# Patient Record
Sex: MALE | Race: WHITE | Employment: UNEMPLOYED | URBAN - METROPOLITAN AREA
[De-identification: names, ages, dates, MRNs, and addresses within clinical notes are randomized per-mention and may not be internally consistent; named-entity substitution may affect disease eponyms.]

---

## 2017-02-08 ENCOUNTER — OFFICE VISIT (OUTPATIENT)
Dept: ORTHOPEDIC SURGERY | Age: 57
End: 2017-02-08

## 2017-02-08 VITALS
WEIGHT: 176 LBS | BODY MASS INDEX: 28.41 KG/M2 | DIASTOLIC BLOOD PRESSURE: 83 MMHG | HEART RATE: 70 BPM | TEMPERATURE: 98 F | SYSTOLIC BLOOD PRESSURE: 163 MMHG

## 2017-02-08 DIAGNOSIS — M19.172 POST-TRAUMATIC OSTEOARTHRITIS OF LEFT ANKLE: Primary | ICD-10-CM

## 2017-02-08 DIAGNOSIS — M79.672 LEFT FOOT PAIN: ICD-10-CM

## 2017-02-08 RX ORDER — HYDROCODONE BITARTRATE AND ACETAMINOPHEN 7.5; 325 MG/1; MG/1
1 TABLET ORAL
Qty: 65 TAB | Refills: 0 | Status: SHIPPED | OUTPATIENT
Start: 2017-03-09 | End: 2017-04-09

## 2017-02-08 RX ORDER — HYDROCODONE BITARTRATE AND ACETAMINOPHEN 7.5; 325 MG/1; MG/1
1 TABLET ORAL
Qty: 65 TAB | Refills: 0 | Status: SHIPPED | OUTPATIENT
Start: 2017-04-10 | End: 2017-05-10

## 2017-02-08 RX ORDER — HYDROCODONE BITARTRATE AND ACETAMINOPHEN 7.5; 325 MG/1; MG/1
1 TABLET ORAL
Qty: 65 TAB | Refills: 0 | Status: SHIPPED | OUTPATIENT
Start: 2017-02-08 | End: 2017-03-08

## 2017-02-08 NOTE — PATIENT INSTRUCTIONS
Please follow up in 3 months. You are advised to contact us if your condition worsens. Osteoarthritis: Care Instructions  Your Care Instructions    Arthritis is a common health problem in which the joints are inflamed. There are several kinds of arthritis. Osteoarthritis is caused by a breakdown of cartilage, the hard, thick tissue that cushions the joints. It causes pain, stiffness, and swelling, often in the spine, fingers, hips, and knees. Osteoarthritis can happen at any age, but it is most common in older people. Osteoarthritis never goes away completely, but it can be controlled. Medicine and home treatment can reduce the pain and prevent the arthritis from getting worse. Follow-up care is a key part of your treatment and safety. Be sure to make and go to all appointments, and call your doctor if you are having problems. Its also a good idea to know your test results and keep a list of the medicines you take. How can you care for yourself at home? · Take a warm shower or bath in the morning to relieve stiffness. Avoid sitting still afterwards. · If the joint is not swollen, use moist heat, like a warm, damp towel, for 20 to 30 minutes, 2 or 3 times a day. Do not use heat on a swollen joint. · If the joint is swollen, use ice or cold packs for 10 to 20 minutes, once an hour. Cold will help relieve pain and reduce inflammation. Put a thin cloth between the ice and your skin. · To prevent stiffness, gently move the joint through its full range of motion several times a day. · If the joint hurts, avoid activities that put a strain on it for a few days. Take rest breaks throughout the day. · Get regular exercise. Walking, swimming, yoga, biking, and water aerobics are good exercises that are gentle on the joints. · Reach and stay at a healthy weight. If you need to lose or maintain weight, regular exercise and a healthy diet will help.  Extra weight can strain the joints, especially the knees and hips, and make the pain worse. Losing even a few pounds may help. · Take pain medicines exactly as directed. ¨ If the doctor gave you a prescription medicine for pain, take it as prescribed. ¨ If you are not taking a prescription pain medicine, ask your doctor if you can take an over-the-counter medicine. When should you call for help? Call your doctor now or seek immediate medical care if:  · The pain is so bad that you cannot use the joint. · You have sudden back pain with weakness in your legs or loss of bowel or bladder control. · Your stools are black and tarlike or have streaks of blood. · You have severe pain and swelling in more than one joint. Watch closely for changes in your health, and be sure to contact your doctor if:  · You have side effects from the medicines, like belly pain, ongoing heartburn, or nausea. · Joint pain continues for more than 6 weeks, and home treatment is not helping. Where can you learn more? Go to http://viola-kenneth.info/. Enter U246 in the search box to learn more about \"Osteoarthritis: Care Instructions. \"  Current as of: February 24, 2016  Content Version: 11.1  © 0844-1747 [employer]. Care instructions adapted under license by [employer] (which disclaims liability or warranty for this information). If you have questions about a medical condition or this instruction, always ask your healthcare professional. [place] [address] any warranty or liability for your use of this information.

## 2017-02-08 NOTE — MR AVS SNAPSHOT
Visit Information Date & Time Provider Department Dept. Phone Encounter #  
 2/8/2017  9:50 AM Lauren Holly, [address] [facility] [phone] [id number] Your Appointments 6/23/2017  8:30 AM  
Follow Up with Maddie Rivera MD  
[facility] - SPECIALTY [facility] ([address]) Appt Note: 6 mo f/u lower back and hips 2012 [facility] [place] [id number] Upcoming Health Maintenance Date Due Hepatitis C Screening 1960 Pneumococcal 19-64 Medium Risk (1 of 1 - PPSV23) 10/20/1979 DTaP/Tdap/Td series (1 - Tdap) 10/20/1981 FOBT Q 1 YEAR AGE 50-75 10/20/2010 INFLUENZA AGE 9 TO ADULT 8/1/2016 Allergies as of 2/8/2017  Review Complete On: 2/8/2017 By: Renzo Clemente Severity Noted Reaction Type Reactions Pcn [Penicillins]  08/01/2013    Rash, Other (comments) As a child - does not know the RXN Sting, Bee    Unknown (comments) Current Immunizations  Never Reviewed No immunizations on file. Not reviewed this visit You Were Diagnosed With   
  
 Codes Comments Post-traumatic osteoarthritis of left ankle    -  Primary ICD-10-CM: T72.935 ICD-9-CM: 715.27 Left foot pain     ICD-10-CM: F75.720 ICD-9-CM: 729.5 Vitals BP Pulse Temp Weight(growth percentile) BMI Smoking Status 163/83 (BP 1 Location: Left arm, BP Patient Position: Sitting) 70 98 °F (36.7 °C) (Oral) 176 lb (79.8 kg) 28.41 kg/m2 Current Every Day Smoker Vitals History BMI and BSA Data Body Mass Index Body Surface Area  
 28.41 kg/m 2 1.93 m 2 Preferred Pharmacy Pharmacy Name Phone [facility], [address] [place] [address] [phone] Your Updated Medication List  
  
   
This list is accurate as of: 2/8/17 10:57 AM.  Always use your most recent med list.  
  
  
  
  
 ASPIRIN LOW DOSE PO Take  by mouth. COREG 6.25 mg tablet Generic drug:  carvedilol Take  by mouth two (2) times daily (with meals). FLONASE 50 mcg/actuation nasal spray Generic drug:  fluticasone 2 Sprays by Both Nostrils route nightly. * gabapentin 800 mg tablet Commonly known as:  NEURONTIN Take 1 Tab by mouth three (3) times daily. * gabapentin 800 mg tablet Commonly known as:  NEURONTIN Take 1 Tab by mouth three (3) times daily. HYDROcodone-acetaminophen 7.5-325 mg per tablet Commonly known as:  Nan Mura Take 2 Tabs by mouth two (2) times daily as needed for Pain for up to 31 days. Max Daily Amount: 4 Tabs. levocetirizine 5 mg tablet Commonly known as:  Maegan Downs Take 5 mg by mouth daily. PROTONIX 40 mg tablet Generic drug:  pantoprazole Take 40 mg by mouth daily. * simvastatin 40 mg tablet Commonly known as:  ZOCOR Take 20 mg by mouth nightly. * simvastatin 20 mg tablet Commonly known as:  ZOCOR TK 1 T PO QHS * Notice: This list has 4 medication(s) that are the same as other medications prescribed for you. Read the directions carefully, and ask your doctor or other care provider to review them with you. Patient Instructions Please follow up in 3 months. You are advised to contact us if your condition worsens. Osteoarthritis: Care Instructions Your Care Instructions Arthritis is a common health problem in which the joints are inflamed. There are several kinds of arthritis. Osteoarthritis is caused by a breakdown of cartilage, the hard, thick tissue that cushions the joints. It causes pain, stiffness, and swelling, often in the spine, fingers, hips, and knees. Osteoarthritis can happen at any age, but it is most common in older people. Osteoarthritis never goes away completely, but it can be controlled.  Medicine and home treatment can reduce the pain and prevent the arthritis from getting worse. Follow-up care is a key part of your treatment and safety. Be sure to make and go to all appointments, and call your doctor if you are having problems. Its also a good idea to know your test results and keep a list of the medicines you take. How can you care for yourself at home? · Take a warm shower or bath in the morning to relieve stiffness. Avoid sitting still afterwards. · If the joint is not swollen, use moist heat, like a warm, damp towel, for 20 to 30 minutes, 2 or 3 times a day. Do not use heat on a swollen joint. · If the joint is swollen, use ice or cold packs for 10 to 20 minutes, once an hour. Cold will help relieve pain and reduce inflammation. Put a thin cloth between the ice and your skin. · To prevent stiffness, gently move the joint through its full range of motion several times a day. · If the joint hurts, avoid activities that put a strain on it for a few days. Take rest breaks throughout the day. · Get regular exercise. Walking, swimming, yoga, biking, and water aerobics are good exercises that are gentle on the joints. · Reach and stay at a healthy weight. If you need to lose or maintain weight, regular exercise and a healthy diet will help. Extra weight can strain the joints, especially the knees and hips, and make the pain worse. Losing even a few pounds may help. · Take pain medicines exactly as directed. ¨ If the doctor gave you a prescription medicine for pain, take it as prescribed. ¨ If you are not taking a prescription pain medicine, ask your doctor if you can take an over-the-counter medicine. When should you call for help? Call your doctor now or seek immediate medical care if: · The pain is so bad that you cannot use the joint. · You have sudden back pain with weakness in your legs or loss of bowel or bladder control. · Your stools are black and tarlike or have streaks of blood. · You have severe pain and swelling in more than one joint. Watch closely for changes in your health, and be sure to contact your doctor if: 
· You have side effects from the medicines, like belly pain, ongoing heartburn, or nausea. · Joint pain continues for more than 6 weeks, and home treatment is not helping. Where can you learn more? Go to http://viola-kenneth.info/. Enter E419 in the search box to learn more about \"Osteoarthritis: Care Instructions. \" Current as of: February 24, 2016 Content Version: 11.1 © 3304-8792 [employer]. Care instructions adapted under license by [employer] (which disclaims liability or warranty for this information). If you have questions about a medical condition or this instruction, always ask your healthcare professional. [address] any warranty or liability for your use of this information. Introducing \[facility]\"" & HEALTH SERVICES! Lance Jones introduces Healthcare MarketMaker patient portal. Now you can access parts of your medical record, email your doctor's office, and request medication refills online. 1. In your internet browser, go to [email or web address] 2. Click on the First Time User? Click Here link in the Sign In box. You will see the New Member Sign Up page. 3. Enter your Healthcare [employer] Access Code exactly as it appears below. You will not need to use this code after youve completed the sign-up process. If you do not sign up before the expiration date, you must request a new code. · Healthcare MarketM[employer] Access Code: [email or web address] Expires: 5/9/2017 10:57 AM 
 
4. Enter the last four digits of your Social Security Number (xxxx) and Date of Birth (mm/dd/yyyy) as indicated and click Submit. You will be taken to the next sign-up page. 5. Create a Mobclixt ID. This will be your Healthcare [employer]Maker login ID and cannot be changed, so think of one that is secure and easy to remember. 6. Create a [employer]xt password. You can change your password at any time. 7. Enter your Password Reset Question and Answer. This can be used at a later time if you forget your password. 8. Enter your e-mail address. You will receive e-mail notification when new information is available in [address] 9. Click Sign Up. You can now view and download portions of your medical record. 10. Click the Download Summary menu link to download a portable copy of your medical information. If you have questions, please visit the Frequently Asked Questions section of the [employer] website. Remember, [employer] is NOT to be used for urgent needs. For medical emergencies, dial 911. Now available from your iPhone and Android! Please provide this summary of care documentation to your next provider. Your primary care clinician is listed as Shayla Cadena. If you have any questions after today's visit, please call [phone].

## 2017-02-08 NOTE — PROGRESS NOTES
AMBULATORY PROGRESS NOTE      Patient: Rene Fabian. MRN: [id number]     SSN: [id number] Body mass index is 28.41 kg/(m^2). YOB: 1960     AGE: 64 y.o. EX: male    PCP: Vannesa Schmitt MD    IMPRESSION/DIAGNOSIS AND TREATMENT PLAN     DIAGNOSES  1. Post-traumatic osteoarthritis of left ankle    2. Left foot pain        Orders Placed This Encounter    HYDROcodone-acetaminophen (NORCO) 7.5-325 mg per tablet    HYDROcodone-acetaminophen (NORCO) 7.5-325 mg per tablet    HYDROcodone-acetaminophen (NORCO) 7.5-325 mg per tablet      Rene Fabian. understands his diagnoses and the proposed plan. Plan:    1) Norco 7.5 m PO BID (with occasional third tab if pain severe); 65 tablets - three prescriptions    RTO - 3 months    HPI Gildardo Gomez. IS A 64 y.o. male who presents to my outpatient office follow up of left subtalar joint osteoarthritis, status post having a remote calcaneus fracture with h/o osteomyelitis. At last visit, I prescribed three months worth of prescriptions of Norco 7.5 mg. The patient presents to the office today ambulating with a single point cane. His pain continues and is most severe through his anterior ankle. When he steps wrong and flexes his ankle he has \"pain like you cannot believe\". He stated that he has been managing his blood pressure. He denies problems with his gastrointestinal system while taking the Norco 7.5 mg.      He had surgery in [place], [place].  He takes Norco 1-2 tab daily, sometimes 3 tabs if his pain becomes worse.      INTERVAL HISTORY:  He is pretty much on autopilot. History of coronary artery disease.  He cannot take anti-inflammatories as he takes blood thinners as well and has hypertension.  Has been very reliable in following up with me on regular intervals to assess his progress and to treat him for his chronic pain.       ANKLE/FOOT Left     Psychiatry: Alert, Oriented x 3; Speech normal in context and clarity,    Memory intact grossly, no involuntary movements - tremors, no dementia  Gait: shuffling  Tenderness: moderate tenderness across right hindfoot  Cutaneous: WNL, He has a well-healed surgical scar laterally. Joint Motion: WNL, limited ankle DF and PF arch, and no hindfoot motion  Joint / Tendon Stability: No Ankle or Subtalar instability or joint laxity. No peroneal sublux ability or dislocation  Alignment:  Normal Foot Alignment moderate and  Flexible, Semi Rigid and Rigid  Neuro Motor/Sensory: NL/NL  Vascular: NL foot/ankle pulses  Lymphatics: No extremity lymphedema, No calf swelling, no tenderness to calf muscles. CHART REVIEW     Past Medical History   Diagnosis Date    Arthritis     CAD (coronary artery disease)     Calcaneus fracture     Calf pain     CH[facility]estive [facility] failure[facility])     Depression     GERD (gastroesophageal reflux disease)     H/O back injury      lumbar - DJD; narrowing of the spine; ? nerve damage    H/O seasonal allergies      all year round    High cholesterol     Hypercholesteremia     Hypertension     Low back pain     Lumbar spinal stenosis     Skull fracture ([facility])      no surgery - between eye and nose     Current Outpatient Prescriptions   Medication Sig    [START ON 4/10/2017] HYDROcodone-acetaminophen (NORCO) 7.5-325 mg per tablet Take 1 Tab by mouth two (2) times daily as needed for Pain for up to 30 days. Max Daily Amount: 2 Tabs.  [START ON 3/9/2017] HYDROcodone-acetaminophen (NORCO) 7.5-325 mg per tablet Take 1 Tab by mouth two (2) times daily as needed for Pain for up to 31 days. Max Daily Amount: 2 Tabs.  HYDROcodone-acetaminophen (NORCO) 7.5-325 mg per tablet Take 1 Tab by mouth two (2) times daily as needed for Pain for up to 28 days. Max Daily Amount: 2 Tabs.     simvastatin (ZOCOR) 20 mg tablet TK 1 T PO QHS    carvedilol (COREG) 6.25 mg tablet Take  by mouth two (2) times daily (with meals).  ASPIRIN LOW DOSE PO Take  by mouth.  pantoprazole (PROTONIX) 40 mg tablet Take 40 mg by mouth daily.  Levocetirizine (XYZAL) 5 mg tablet Take 5 mg by mouth daily.  fluticasone (FLONASE) 50 mcg/actuation nasal spray 2 Sprays by Both Nostrils route nightly.  simvastatin (ZOCOR) 40 mg tablet Take 20 mg by mouth nightly.  gabapentin (NEURONTIN) 800 mg tablet Take 1 Tab by mouth three (3) times daily.  HYDROcodone-acetaminophen (NORCO) 7.5-325 mg per tablet Take 2 Tabs by mouth two (2) times daily as needed for Pain for up to 31 days. Max Daily Amount: 4 Tabs.  gabapentin (NEURONTIN) 800 mg tablet Take 1 Tab by mouth three (3) times daily. No current facility-administered medications for this visit. Allergies   Allergen Reactions    Pcn [Penicillins] Rash and Other (comments)     As a child - does not know the RXN    Sting, Bee Unknown (comments)     Past Surgical History   Procedure Laterality Date    Hx orthopaedic       left foot surgery - shattered the heel and broke the calcanus    Hx mohs procedure Left 9/13     Social History     Occupational History    Not on file. Social History Main Topics    Smoking status: Current Every Day Smoker     Packs/day: 1.00     Years: 40.00    Smokeless tobacco: Never Used    Alcohol use No    Drug use: No    Sexual activity: Not on file     Family History   Problem Relation Age of Onset    Cancer Other     Hypertension Other     Heart Disease Other     Diabetes Other        REVIEW OF SYSTEMS : 2/8/2017  ALL BELOW ARE Negative except : SEE HPI       Constitutional: Negative for fever, chills and weight loss. Neg Weigh Loss  Cardiovascular: Negative for chest pain, claudication and leg swelling. SOB, PIERSON   Gastrointestinal: Negative for  pain, N/V/D/C, Blood in stool or urine,dysuria, hematuria,        Incontinence, pelvic pain  Musculoskeletal: see HPI. Neurological: Negative for dizziness and weakness. Negative for headaches,Visual Changes, Confusion, Seizures,   Psychiatric/Behavioral: Negative for depression, memory loss and substance abuse. Extremities:  Negative for  hair changes, rash or skin lesion changes. Hematologic: Negative for Bleeding problems, bruising, pallor or swollen lymph nodes.   Peripheral Vascular: No calf pain, vascular vein tenderness to calf pain              No calf throbbing, posterior knee throbbing pain    DIAGNOSTIC IMAGING     No notes on file    Written by Amadou Dye, as dictated by Jamshid Conte MD.

## 2017-05-10 ENCOUNTER — OFFICE VISIT (OUTPATIENT)
Dept: ORTHOPEDIC SURGERY | Age: 57
End: 2017-05-10

## 2017-05-10 VITALS
HEART RATE: 51 BPM | BODY MASS INDEX: 28.28 KG/M2 | DIASTOLIC BLOOD PRESSURE: 82 MMHG | TEMPERATURE: 97.5 F | WEIGHT: 176 LBS | SYSTOLIC BLOOD PRESSURE: 148 MMHG | HEIGHT: 66 IN

## 2017-05-10 DIAGNOSIS — G89.29 CHRONIC PAIN OF LEFT ANKLE: ICD-10-CM

## 2017-05-10 DIAGNOSIS — M25.572 CHRONIC PAIN OF LEFT ANKLE: ICD-10-CM

## 2017-05-10 DIAGNOSIS — M19.172 POST-TRAUMATIC OSTEOARTHRITIS OF LEFT ANKLE: Primary | ICD-10-CM

## 2017-05-10 RX ORDER — HYDROCODONE BITARTRATE AND ACETAMINOPHEN 7.5; 325 MG/1; MG/1
1-2 TABLET ORAL
Qty: 65 TAB | Refills: 0 | Status: SHIPPED | OUTPATIENT
Start: 2017-05-10 | End: 2017-06-11

## 2017-05-10 RX ORDER — HYDROCODONE BITARTRATE AND ACETAMINOPHEN 7.5; 325 MG/1; MG/1
1-2 TABLET ORAL
Qty: 65 TAB | Refills: 0 | Status: SHIPPED | OUTPATIENT
Start: 2017-06-12 | End: 2017-07-13

## 2017-05-10 NOTE — MR AVS SNAPSHOT
Visit Information Date & Time Provider Department Dept. Phone Encounter #  
 5/10/2017  9:10 AM Myesha Ramirez MD [facility] [id number] Follow-up Instructions Return in about 2 months (around 7/10/2017). Your Appointments 6/23/2017  8:30 AM  
Follow Up with Jeff Gomez MD  
[facility] - SPECIALTY [facility] ([facility]) Appt Note: 6 mo f/u lower back and hips 2012 [facility] [id number]  
[address] [facility] Upcoming Health Maintenance Date Due Hepatitis C Screening 1960 Pneumococcal 19-64 Medium Risk (1 of 1 - PPSV23) 10/20/1979 DTaP/Tdap/Td series (1 - Tdap) 10/20/1981 FOBT Q 1 YEAR AGE 50-75 10/20/2010 INFLUENZA AGE 9 TO ADULT 8/1/2017 Allergies as of 5/10/2017  Review Complete On: 5/10/2017 By: Myesha Ramirez MD  
  
 Severity Noted Reaction Type Reactions Pcn [Penicillins]  08/01/2013    Rash, Other (comments) As a child - does not know the RXN Sting, Bee    Unknown (comments) Current Immunizations  Never Reviewed No immunizations on file. Not reviewed this visit You Were Diagnosed With   
  
 Codes Comments Post-traumatic osteoarthritis of left ankle    -  Primary ICD-10-CM: U73.723 ICD-9-CM: 715.27 Chronic pain of left ankle     ICD-10-CM: M25.572, G89.29 ICD-9-CM: 719.47, 338.29 Vitals BP Pulse Temp Height(growth percentile) Weight(growth percentile) BMI  
 148/82 (!) 51 97.5 °F (36.4 °C) (Oral) 5' 6\" (1.676 m) 176 lb (79.8 kg) 28.41 kg/m2 Smoking Status Current Every Day Smoker BMI and BSA Data Body Mass Index Body Surface Area  
 28.41 kg/m 2 1.93 m 2 Preferred Pharmacy Pharmacy Name Phone [address] [facility], [address] [facility] [phone] Your Updated Medication List  
  
   
This list is accurate as of: 5/10/17  9:36 AM.  Always use your most recent med list.  
  
  
  
  
 ASPIRIN LOW DOSE PO Take  by mouth. COREG 6.25 mg tablet Generic drug:  carvedilol Take  by mouth two (2) times daily (with meals). FLONASE 50 mcg/actuation nasal spray Generic drug:  fluticasone 2 Sprays by Both Nostrils route nightly. * gabapentin 800 mg tablet Commonly known as:  NEURONTIN Take 1 Tab by mouth three (3) times daily. * gabapentin 800 mg tablet Commonly known as:  NEURONTIN Take 1 Tab by mouth three (3) times daily. HYDROcodone-acetaminophen 7.5-325 mg per tablet Commonly known as:  Milinda Copier Take 1 Tab by mouth two (2) times daily as needed for Pain for up to 30 days. Max Daily Amount: 2 Tabs. levocetirizine 5 mg tablet Commonly known as:  Raymond Closter Take 5 mg by mouth daily. PROTONIX 40 mg tablet Generic drug:  pantoprazole Take 40 mg by mouth daily. * simvastatin 40 mg tablet Commonly known as:  ZOCOR Take 20 mg by mouth nightly. * simvastatin 20 mg tablet Commonly known as:  ZOCOR TK 1 T PO QHS * Notice: This list has 4 medication(s) that are the same as other medications prescribed for you. Read the directions carefully, and ask your doctor or other care provider to review them with you. We Performed the Following REFERRAL TO PAIN MANAGEMENT [NZM109 Custom] Comments:  
 Evaluation and treatment of left post-traumatic osteoarthritis of the left ankle. Follow-up Instructions Return in about 2 months (around 7/10/2017). Referral Information Referral ID Referred By Referred To  
  
 [id number] Gala CARTY MD   
   [address] [place] [address], . [address] Phone: [phone] Fax: [phone] Visits Status Start Date End Date 1 New Request 5/10/17 5/10/18 If your referral has a status of pending review or denied, additional information will be sent to support the outcome of this decision. Patient Instructions Please follow up in 2 months. You are advised to contact us if your condition worsens. Osteoarthritis: Care Instructions Your Care Instructions Arthritis is a common health problem in which the joints are inflamed. There are several kinds of arthritis. Osteoarthritis is caused by a breakdown of cartilage, the hard, thick tissue that cushions the joints. It causes pain, stiffness, and swelling, often in the spine, fingers, hips, and knees. Osteoarthritis can happen at any age, but it is most common in older people. Osteoarthritis never goes away completely, but it can be controlled. Medicine and home treatment can reduce the pain and prevent the arthritis from getting worse. Follow-up care is a key part of your treatment and safety. Be sure to make and go to all appointments, and call your doctor if you are having problems. It's also a good idea to know your test results and keep a list of the medicines you take. How can you care for yourself at home? · Take a warm shower or bath in the morning to relieve stiffness. Avoid sitting still afterwards. · If the joint is not swollen, use moist heat, like a warm, damp towel, for 20 to 30 minutes, 2 or 3 times a day. Do not use heat on a swollen joint. · If the joint is swollen, use ice or cold packs for 10 to 20 minutes, once an hour. Cold will help relieve pain and reduce inflammation. Put a thin cloth between the ice and your skin. · To prevent stiffness, gently move the joint through its full range of motion several times a day. · If the joint hurts, avoid activities that put a strain on it for a few days. Take rest breaks throughout the day. · Get regular exercise. Walking, swimming, yoga, biking, zeus chi, and water aerobics are good exercises that are gentle on the joints. · Reach and stay at a healthy weight. If you need to lose or maintain weight, regular exercise and a healthy diet will help. Extra weight can strain the joints, especially the knees and hips, and make the pain worse. Losing even a few pounds may help. · Take pain medicines exactly as directed. ¨ If the doctor gave you a prescription medicine for pain, take it as prescribed. ¨ If you are not taking a prescription pain medicine, ask your doctor if you can take an over-the-counter medicine. When should you call for help? Call your doctor now or seek immediate medical care if: · The pain is so bad that you cannot use the joint. · You have sudden back pain with weakness in your legs or loss of bowel or bladder control. · Your stools are black and tarlike or have streaks of blood. · You have severe pain and swelling in more than one joint. Watch closely for changes in your health, and be sure to contact your doctor if: 
· You have side effects from the medicines, like belly pain, ongoing heartburn, or nausea. · Joint pain continues for more than 6 weeks, and home treatment is not helping. Where can you learn more? Go to http://ivola-kenneth.info/. Enter E951 in the search box to learn more about \"Osteoarthritis: Care Instructions. \" Current as of: November 28, 2016 Content Version: 11.2 © 4183-1985 [employer]. Care instructions adapted under license by [employer] (which disclaims liability or warranty for this information). If you have questions about a medical condition or this instruction, always ask your healthcare professional. [place] [address] any warranty or liability for your use of this information. Introducing [facility]! Christi Rocha introduces [employer] patient portal. Now you can access parts of your medical record, email your doctor's office, and request medication refills online.    
 
1. In your internet browser, go to [email or web address] 2. Click on the First Time User? Click Here link in the Sign In box. You will see the New Member Sign Up page. 3. Enter your [employer] Access Code exactly as it appears below. You will not need to use this code after youve completed the sign-up process. If you do not sign up before the expiration date, you must request a new code. · [employer] Access Code: [email or web address] Expires: 8/8/2017  9:36 AM 
 
4. Enter the last four digits of your Social Security Number (xxxx) and Date of Birth (mm/dd/yyyy) as indicated and click Submit. You will be taken to the next sign-up page. 5. Create a [employer] ID. This will be your [employer] login ID and cannot be changed, so think of one that is secure and easy to remember. 6. Create a [employer] password. You can change your password at any time. 7. Enter your Password Reset Question and Answer. This can be used at a later time if you forget your password. 8. Enter your e-mail address. You will receive e-mail notification when new information is available in [address] 9. Click Sign Up. You can now view and download portions of your medical record. 10. Click the Download Summary menu link to download a portable copy of your medical information. If you have questions, please visit the Frequently Asked Questions section of the [employer] website. Remember, [employer] is NOT to be used for urgent needs. For medical emergencies, dial 911. Now available from your iPhone and Android! Please provide this summary of care documentation to your next provider. Your primary care clinician is listed as Inocencio Cesar. If you have any questions after today's visit, please call [phone].

## 2019-12-15 NOTE — PROGRESS NOTES
AMBULATORY PROGRESS NOTE      Patient: Kassie Strange MRN: [id number]     SSN: [id number] Body mass index is 28.41 kg/(m^2). YOB: 1960     AGE: 64 y.o. EX: male    PCP: Perla Jane MD    IMPRESSION/DIAGNOSIS AND TREATMENT PLAN     DIAGNOSES  1. Post-traumatic osteoarthritis of left ankle    2. Chronic pain of left ankle        Orders Placed This Encounter    REFERRAL TO PAIN MANAGEMENT    HYDROcodone-acetaminophen (NORCO) 7.5-325 mg per tablet    HYDROcodone-acetaminophen (NORCO) 7.5-325 mg per tablet      Kassie Strange understands his diagnoses and the proposed plan. Plan:    1) Norco 7.5 m-2 PO BID (with occasional third tab if pain severe); 65 tablets - two prescriptions  2) Referral to pain management (Last prescriptions placed)    RTO - 2 months    HPI Gildardo Alejandra IS A 64 y.o. male who presents to my outpatient office follow up of left subtalar joint osteoarthritis, status post having a remote calcaneus fracture with h/o osteomyelitis. At last visit, I prescribed three months worth of prescriptions of Norco 7.5 mg. The patient presents to the office today ambulating with a single point cane. Patient states that his prescriptions need to be worded to allow his full prescription to be issued to him. Pain management was discussed with the patient, and he understood that this would be his last prescription. INTERVAL HISTORY:  He is pretty much on autopilot. History of coronary artery disease.  He cannot take anti-inflammatories as he takes blood thinners as well and has hypertension.  Has been very reliable in following up with me on regular intervals to assess his progress and to treat him for his chronic pain. He had surgery in [place], [place].  He takes Norco 1-2 tab daily, sometimes 3 tabs if his pain becomes worse.      ANKLE/FOOT Left      Psychiatry: Alert, Oriented x 3; Speech normal in context and clarity,    Memory intact grossly, no involuntary movements - tremors, no dementia  Gait: shuffling  Tenderness: moderate tenderness across right hindfoot  Cutaneous: WNL, He has a well-healed surgical scar laterally. Joint Motion: limited ankle DF and PF arch, and no hindfoot motion  Joint / Tendon Stability: No Ankle or Subtalar instability or joint laxity. No peroneal sublux ability or dislocation  Alignment: Normal Foot Alignment moderate and Flexible, Semi Rigid and Rigid  Neuro Motor/Sensory: NL/NL  Vascular: NL foot/ankle pulses  Lymphatics: No extremity lymphedema, No calf swelling, no tenderness to calf muscles. CHART REVIEW     Past Medical History:   Diagnosis Date    Arthritis     CAD (coronary artery disease)     Calcaneus fracture     Calf pain     CHF [facility]congestive heart failure) ([facility])     Depression     GERD (gastroesophageal reflux disease)     H/O back injury     lumbar - DJD; narrowing of the spine; ? nerve damage    H/O seasonal allergies     all year round    High cholesterol     Hypercholesteremia     Hypertension     Low back pain     Lumbar spinal stenosis     Skull fracture ([facility])     no surgery - between eye and nose     Current Outpatient Prescriptions   Medication Sig    [START ON 6/12/2017] HYDROcodone-acetaminophen (NORCO) 7.5-325 mg per tablet Take 1-2 Tabs by mouth two (2) times daily as needed for Pain for up to 31 days. Max Daily Amount: 4 Tabs.  HYDROcodone-acetaminophen (NORCO) 7.5-325 mg per tablet Take 1-2 Tabs by mouth two (2) times daily as needed for Pain for up to 32 days. Max Daily Amount: 4 Tabs.  HYDROcodone-acetaminophen (NORCO) 7.5-325 mg per tablet Take 1 Tab by mouth two (2) times daily as needed for Pain for up to 30 days. Max Daily Amount: 2 Tabs.  simvastatin (ZOCOR) 20 mg tablet TK 1 T PO QHS    gabapentin (NEURONTIN) 800 mg tablet Take 1 Tab by mouth three (3) times daily.     carvedilol (COREG) 6.25 mg tablet Take  by mouth two (2) times daily (with meals).  gabapentin (NEURONTIN) 800 mg tablet Take 1 Tab by mouth three (3) times daily.  ASPIRIN LOW DOSE PO Take  by mouth.  pantoprazole (PROTONIX) 40 mg tablet Take 40 mg by mouth daily.  Levocetirizine (XYZAL) 5 mg tablet Take 5 mg by mouth daily.  fluticasone (FLONASE) 50 mcg/actuation nasal spray 2 Sprays by Both Nostrils route nightly.  simvastatin (ZOCOR) 40 mg tablet Take 20 mg by mouth nightly. No current facility-administered medications for this visit. Allergies   Allergen Reactions    Pcn [Penicillins] Rash and Other (comments)     As a child - does not know the RXN    Sting, Bee Unknown (comments)     Past Surgical History:   Procedure Laterality Date    HX MOHS PROCEDURES Left 9/13    HX ORTHOPAEDIC      left foot surgery - shattered the heel and broke the calcanus     Social History     Occupational History    Not on file. Social History Main Topics    Smoking status: Current Every Day Smoker     Packs/day: 1.00     Years: 40.00    Smokeless tobacco: Never Used    Alcohol use No    Drug use: No    Sexual activity: Not on file     Family History   Problem Relation Age of Onset    Cancer Other     Hypertension Other     Heart Disease Other     Diabetes Other        REVIEW OF SYSTEMS : 5/10/2017  ALL BELOW ARE Negative except : SEE HPI       Constitutional: Negative for fever, chills and weight loss. Neg Weigh Loss  Cardiovascular: Negative for chest pain, claudication and leg swelling. SOB, PIERSON   Gastrointestinal: Negative for  pain, N/V/D/C, Blood in stool or urine,dysuria, hematuria,        Incontinence, pelvic pain  Musculoskeletal: see HPI. Neurological: Negative for dizziness and weakness. Negative for headaches,Visual Changes, Confusion, Seizures,   Psychiatric/Behavioral: Negative for depression, memory loss and substance abuse.    Extremities:  Negative for  hair changes, rash or skin lesion changes. Hematologic: Negative for Bleeding problems, bruising, pallor or swollen lymph nodes. Peripheral Vascular: No calf pain, vascular vein tenderness to calf pain              No calf throbbing, posterior knee throbbing pain    DIAGNOSTIC IMAGING     No notes on file    Written by Lorraine Garcia, as dictated by Nirmala Sheehan MD. I, , Nirmala Sheehan MD, confirm that all documentation is accurate. 15-Dec-2019 05:40